# Patient Record
(demographics unavailable — no encounter records)

---

## 2025-02-10 NOTE — DATA REVIEWED
[FreeTextEntry1] : I performed an arterial duplex which was medically necessary to evaluate for arterial disease. It showed no evidence of aortic aneurysm and patent femoral and popliteal arteries bilaterally.

## 2025-02-10 NOTE — HISTORY OF PRESENT ILLNESS
[FreeTextEntry1] : 50 y/o F presents for leg pain, no trauma, compliant with compression stockings daily. Had a venous duplex in January 2025 that showed no evidence of DVT in the LE bilaterally. She has ho intracranial aneurysm and requests testing to evaluate for AAA and LE arterial disease.

## 2025-02-10 NOTE — ASSESSMENT
[FreeTextEntry1] : 50 y/o F presents for leg pain, no trauma, compliant with compression stockings daily. Had a venous duplex in January 2025 that showed no evidence of DVT in the LE bilaterally. She has ho intracranial aneurysm and requests testing to evaluate for AAA and LE arterial disease.  Duplex showed no evidence of aortic aneurysm and patent femoral and popliteal arteries bilaterally.  She was informed of test results and reassured.  F/u as needed.

## 2025-02-10 NOTE — PHYSICAL EXAM
[2+] : left 2+ [Ankle Swelling Bilaterally] : bilaterally  [Ankle Swelling (On Exam)] : not present [Varicose Veins Of Lower Extremities] : not present [] : not present

## 2025-04-30 NOTE — ASSESSMENT
[FreeTextEntry1] : The patient has family history of premature CAD , who has had long standing palpitations . Etiology is uncertain . She had seen EP in the past for this and she was thought to have short burst of  PAC;s or PVC's  . She has had SOB when going up stairs. ECG is abnormal from EF with more pronounced ST -T changes in the inferolateral leads . Previous ischemia work up was negative for ischemia according to the patient and the patient had a EPTACH which was taken off on Monday .

## 2025-04-30 NOTE — HISTORY OF PRESENT ILLNESS
[FreeTextEntry1] : The patient has long standing history of having palpitations  . This occurs around the time of her menstrual period . She was seen in the ER in February for epigastric pain . W/U was negative . She has a history of having an right cavernous ICA aneurysm . She does have pulsatile tinnitus for may years and it is followed by interventional radiology . The patient was noted to have ST-T changes which where nonspecific . The patient has had no chest pain but has belching at times of palpitations  The patient had an EPATCH and results are pending .

## 2025-04-30 NOTE — REVIEW OF SYSTEMS
[Feeling Fatigued] : feeling fatigued [SOB] : shortness of breath [Dyspnea on exertion] : dyspnea during exertion [Palpitations] : palpitations [Joint Pain] : joint pain [Anxiety] : anxiety [Negative] : Neurological [Chest Discomfort] : no chest discomfort